# Patient Record
Sex: MALE | Race: WHITE
[De-identification: names, ages, dates, MRNs, and addresses within clinical notes are randomized per-mention and may not be internally consistent; named-entity substitution may affect disease eponyms.]

---

## 2018-07-14 ENCOUNTER — HOSPITAL ENCOUNTER (EMERGENCY)
Dept: HOSPITAL 14 - H.ER | Age: 43
LOS: 1 days | Discharge: HOME | End: 2018-07-15
Payer: COMMERCIAL

## 2018-07-14 VITALS — RESPIRATION RATE: 18 BRPM | TEMPERATURE: 98.5 F | OXYGEN SATURATION: 97 %

## 2018-07-14 DIAGNOSIS — Z23: ICD-10-CM

## 2018-07-14 DIAGNOSIS — L03.113: Primary | ICD-10-CM

## 2018-07-15 VITALS — SYSTOLIC BLOOD PRESSURE: 136 MMHG | HEART RATE: 80 BPM | DIASTOLIC BLOOD PRESSURE: 84 MMHG

## 2018-07-15 NOTE — RAD
Date of service: 



07/14/2018



PROCEDURE:  Radiographs of the right elbow.



HISTORY:

pain and swelling  



COMPARISON:

No prior.



FINDINGS:



BONES:

Normal. No fracture.



JOINTS:

Normal. No osteoarthritis.



SOFT TISSUES:

Normal.



JOINT EFFUSION:

None.



OTHER FINDINGS:

None.



IMPRESSION:

Unremarkable radiographs of the right elbow.

## 2018-07-15 NOTE — ED PDOC
Upper Extremity Pain/Injury


Time Seen by Provider: 07/14/18 23:41


Chief Complaint (Nursing): Upper Extremity Problem/Injury


Chief Complaint (Provider): right elbow swelling and redness


History Per: Patient


History/Exam Limitations: no limitations


Onset/Duration Of Symptoms: Hrs (earlier today)


Current Symptoms Are (Timing): Still Present


Quality: "Pain"


Additional Complaint(s): 





Anna Beyer is a 43 year old male, with no significant past medical history, 

who presents to the emergency department after he noticed earlier today he had 

mild pain, swelling and redness to his right elbow. Patient states he is a 

 and left hand dominant. He did not take any medications to 

help with the pain. He denies any trauma, fever, chills, numbness or tingling. 

No further medical complaints.





PMD: None provided. 





Past Medical History


Reviewed: Historical Data, Nursing Documentation, Vital Signs


Vital Signs: 


 Last Vital Signs











Temp  98.5 F   07/14/18 23:23


 


Pulse  80   07/15/18 00:46


 


Resp  18   07/14/18 23:23


 


BP  136/84   07/15/18 00:46


 


Pulse Ox  97   07/14/18 23:23














- Medical History


PMH: Asthma


   Denies: Anxiety, Bipolar Disorder, Depression, Pancreatitis, Paranoia, Post 

Traumatic Stress Disorder, Schizophrenia, Sexually Transmitted Disease





- Surgical History


Surgical History: No Surg Hx


   Denies: Appendectomy, CABG, Carotid Endarterectomy, Cholecystectomy, 

Coronary Stent





- Family History


Family History: States: Unknown Family Hx





- Social History


Current smoker - smoking cessation education provided: No


Alcohol: None


Drugs: Denies





- Home Medications


Home Medications: 


 Ambulatory Orders











 Medication  Instructions  Recorded


 


Acetaminophen [Tylenol] 650 mg PO Q6 PRN #0 tab 10/12/14


 


Acetaminophen/Oxycodone Hydr 1 tab PO Q6 PRN #30 tab 10/12/14





[Percocet 325 mg-5 mg]  


 


Cephalexin [cephalexin] 500 mg PO Q6 #28 cap 07/15/18














- Allergies


Allergies/Adverse Reactions: 


 Allergies











Allergy/AdvReac Type Severity Reaction Status Date / Time


 


No Known Allergies Allergy   Verified 10/12/14 03:19














Review of Systems


ROS Statement: Except As Marked, All Systems Reviewed And Found Negative


Constitutional: Negative for: Fever, Chills


Musculoskeletal: Positive for: Arm Pain (right elbow swelling and redness)


Neurological: Negative for: Numbness (tingling)





Physical Exam





- Reviewed


Nursing Documentation Reviewed: Yes


Vital Signs Reviewed: Yes





- Physical Exam


Appears: Positive for: No Acute Distress


Head Exam: Positive for: ATRAUMATIC, NORMAL INSPECTION, NORMOCEPHALIC


Skin: Positive for: Normal Color, Warm, Dry


Eye Exam: Positive for: Normal appearance, EOMI, PERRL


Neck: Positive for: Painless ROM


Respiratory: Negative for: Respiratory Distress


Pulses-Radial (L): 2+


Pulses-Radial (R): 2+


Extremity: Positive for: Normal ROM (full ROM actively of the right elbow), 

Swelling (under right posterior elbow there is a mild erythema with minimal 

swelling, no fluctuance and non-indurated with a superficial abrasions over it.)

.  Negative for: Deformity


Neurologic/Psych: Positive for: Alert, Oriented





- ECG


O2 Sat by Pulse Oximetry: 97 (RA)


Pulse Ox Interpretation: Normal





Medical Decision Making


Medical Decision Making: 





Time: 23:41


Initial Impression: Cellulitis





Initial Plan:





--Adacel 0.5 ml IM


--Keflex 500 mg PO


--Elbow right 3 views [RAD]


--Reevaluation





00:35


Upon provider reevaluation patient is feeling better, is medically stable, and 

requires no further treatment in the ED at this time. Patient will be 

discharged home with Rx for Cephalexin. Counseling was provided and all 

questions were answered regarding diagnosis and need for follow up with 

orthopedist and clinic. There is agreement to discharge plan. Return if 

symptoms persist or worsen.





--------------------------------------------------------------------------------

-----





Scribe Attestation:


Documented by Jose Zuniga, acting as a scribe for Bertrand Calderon PA-C.





Provider Scribe Attestation:


All medical record entries made by the Scribe were at my direction and 

personally dictated by me. I have reviewed the chart and agree that the record 

accurately reflects my personal performance of the history, physical exam, 

medical decision making, and the department course for this patient. I have 

also personally directed, reviewed, and agree with the discharge instructions 

and disposition.





Disposition





- Clinical Impression


Clinical Impression: 


 Cellulitis








- Disposition


Referrals: 


[facility] [Outside]


Juan Ramon Khalil III, MD [Staff Provider] - 


Disposition: Routine/Home


Disposition Time: 00:35


Condition: STABLE


Additional Instructions: 





ANNA BEYER, thank you for letting us take care of you today. Your provider 

was Bijan Jo MD and you were treated for BITE:RT ELBOW. The 

emergency medical care you received today was directed at your acute symptoms. 

If you were prescribed any medication, please fill it and take as directed. It 

may take several days for your symptoms to resolve. Return to the Emergency 

Department if your symptoms worsen, do not improve, or if you have any other 

problems.





Please contact your doctor or call one of the physicians/clinics you have been 

referred to that are listed on the Patient Visit Information form that is 

included in your discharge packet. Bring any paperwork you were given at 

discharge with you along with any medications you are taking to your follow up 

visit. Our treatment cannot replace ongoing medical care by a primary care 

provider outside of the emergency department.





Thank you for allowing the [facility] team to be part of your care today.








If you had an X-Ray or CT scan: A Radiologist will review the ED reading if any 

change in treatment is needed we will contact you.***





If you had a blood, urine, or wound culture: It will take several days for the 

results, if any change in treatment is needed we will contact you.***





If you had an STI test: It will take 48 hours for the results. Please call 

after 1 week if you have not heard back.***


Prescriptions: 


Cephalexin [cephalexin] 500 mg PO Q6 #28 cap


Instructions:  Cellulitis (Skin Infection), Adult (DC)


Forms:  [facility] (English)


Print Language: ENGLISH